# Patient Record
(demographics unavailable — no encounter records)

---

## 2025-05-02 NOTE — ASSESSMENT
[FreeTextEntry1] : for cpe medically stable  check labs thyroid stable follow up 6months [Vaccines Reviewed] : Immunizations reviewed today. Please see immunization details in the vaccine log within the immunization flowsheet.

## 2025-05-02 NOTE — HEALTH RISK ASSESSMENT
[Excellent] : ~his/her~  mood as  excellent [Yes] : Yes [Monthly or less (1 pt)] : Monthly or less (1 point) [No falls in past year] : Patient reported no falls in the past year [Little interest or pleasure doing things] : 1) Little interest or pleasure doing things [Feeling down, depressed, or hopeless] : 2) Feeling down, depressed, or hopeless [0] : 2) Feeling down, depressed, or hopeless: Not at all (0) [PHQ-2 Negative - No further assessment needed] : PHQ-2 Negative - No further assessment needed [[id number]] : 0 [No] : does not take [Never] : Never [NO] : No [HIV test declined] : HIV test declined [Hepatitis C test declined] : Hepatitis C test declined [Change in mental status noted] : No change in mental status noted [Language] : denies difficulty with language [Behavior] : denies difficulty with behavior [Learning/Retaining New Information] : denies difficulty learning/retaining new information [Handling Complex Tasks] : denies difficulty handling complex tasks [Reasoning] : denies difficulty with reasoning

## 2025-05-02 NOTE — PAST MEDICAL HISTORY
Refill protocol for  Omeprazole  6/9/2018 Last office visit  Wt Readings from Last 1 Encounters:   06/15/18 79.4 kg        BP Readings from Last 2 Encounters:   06/15/18 130/70   06/09/18 136/72   ]    Lab Results   Component Value Date    SODIUM 138 12/16/2017    POTASSIUM 4.6 05/26/2018    CHLORIDE 103 12/16/2017    CO2 25 12/16/2017    BUN 19 12/16/2017    CREATININE 1.09 05/26/2018    GLUCOSE 159 (H) 05/26/2018     Hemoglobin A1C (%)   Date Value   06/09/2018 11.0 (H)     TSH (mcUnits/mL)   Date Value   06/27/2015 0.755     Lab Results   Component Value Date    CHOLESTEROL 173 12/16/2017    HDL 42 12/16/2017    CALCLDL 94 12/16/2017    TRIGLYCERIDE 183 (H) 12/16/2017     Lab Results   Component Value Date    AST 12 12/16/2017    GPT 25 12/16/2017    ALKPT 139 (H) 12/16/2017    BILIRUBIN 0.7 12/16/2017       
[Menstruating] : menstruating

## 2025-07-11 NOTE — PROCEDURE
[Cervical Pap Smear] : cervical Pap smear [Liquid Base] : liquid base [Tolerated Well] : the patient tolerated the procedure well [No Complications] : there were no complications [Transvaginal OB Sonogram] : Transvaginal OB Sonogram [Intrauterine Pregnancy] : intrauterine pregnancy [Yolk Sac] : yolk sac present [Current GA by Sonogram: ___ (wks)] : Current GA by Sonogram: [unfilled]Uwks

## 2025-07-11 NOTE — HISTORY OF PRESENT ILLNESS
[N] : Patient does not use contraception [Y] : Positive pregnancy history [Menarche Age: ____] : age at menarche was [unfilled] [FreeTextEntry1] : 31-year-old F LMP 5/12/2025 presenting for annual GYN with positive UPT at home two weeks ago.   OB History  - 9/2019 PT CS at 30 weeks due to eclampsia. SICU admission. Female infant. 2 lbs 10 oz. NICUx30 days.  - 3/2021  FT CS. Female infant. 7 lbs 12 oz.    [PGHxTotal] : 3 [facility] [PGHxPremature] : 0 [PGHxAbortions] : 0 [[facility] [PGHxABInduced] : 0 [PGHxABSpont] : 0 [PGHxEctopic] : 0 [PGHxMultBirths] : 0

## 2025-07-11 NOTE — PHYSICAL EXAM
[Chaperoned Physical Exam] : A chaperone was present in the examining room during all aspects of the physical examination. [MA] : MA [FreeTextEntry2] : Carlee [Appropriately responsive] : appropriately responsive [Alert] : alert [No Acute Distress] : no acute distress [Regular Rate Rhythm] : regular rate rhythm [Soft] : soft [Non-tender] : non-tender [Non-distended] : non-distended [No HSM] : No HSM [No Lesions] : no lesions [No Mass] : no mass [Oriented x3] : oriented x3 [FreeTextEntry5] : Breathing comfortably on room air [Examination Of The Breasts] : a normal appearance [No Masses] : no breast masses were palpable [Labia Majora] : normal [Labia Minora] : normal [Normal] : normal [Uterine Adnexae] : normal

## 2025-07-11 NOTE — DISCUSSION/SUMMARY
[FreeTextEntry1] : # Health Maintenance - Cervical cancer screening: Pap smear obtained. - Breast cancer screening: Not indicated at this time. - Colon cancer screening: Not indicated at this time. - Osteoporosis screening: Not indicated at this time. - STI screening: CG/CH  # Early pregnancy  - RTO 2-3 weeks sonogram